# Patient Record
Sex: MALE | Race: BLACK OR AFRICAN AMERICAN
[De-identification: names, ages, dates, MRNs, and addresses within clinical notes are randomized per-mention and may not be internally consistent; named-entity substitution may affect disease eponyms.]

---

## 2018-04-01 ENCOUNTER — HOSPITAL ENCOUNTER (EMERGENCY)
Dept: HOSPITAL 17 - PHED | Age: 23
Discharge: HOME | End: 2018-04-01
Payer: SELF-PAY

## 2018-04-01 VITALS
SYSTOLIC BLOOD PRESSURE: 139 MMHG | OXYGEN SATURATION: 98 % | DIASTOLIC BLOOD PRESSURE: 73 MMHG | RESPIRATION RATE: 16 BRPM | TEMPERATURE: 99 F | HEART RATE: 76 BPM

## 2018-04-01 VITALS — HEART RATE: 80 BPM | SYSTOLIC BLOOD PRESSURE: 142 MMHG | RESPIRATION RATE: 16 BRPM | DIASTOLIC BLOOD PRESSURE: 77 MMHG

## 2018-04-01 VITALS — HEIGHT: 73 IN | WEIGHT: 199.08 LBS | BODY MASS INDEX: 26.38 KG/M2

## 2018-04-01 DIAGNOSIS — E11.9: Primary | ICD-10-CM

## 2018-04-01 DIAGNOSIS — Z88.0: ICD-10-CM

## 2018-04-01 PROCEDURE — 96372 THER/PROPH/DIAG INJ SC/IM: CPT

## 2018-04-01 PROCEDURE — 99283 EMERGENCY DEPT VISIT LOW MDM: CPT

## 2018-04-01 NOTE — PD
HPI


Chief Complaint:  Diabetic


Time Seen by Provider:  19:37


Travel History


International Travel<30 days:  No


Contact w/Intl Traveler<30days:  No


Traveled to known affect area:  No





History of Present Illness


HPI


This is a 22-year-old male presented the ER under police custody for insulin 

administration.  Patient is insulin-dependent diabetic and that he did not take 

his insulin today and is here for taken his insulin.  Patient has no complaints 

or symptoms.





PFSH


Past Medical History


Diabetes:  Yes


Patient Takes Glucophage:  No


Tetanus Vaccination:  Unknown


Influenza Vaccination:  No


Pregnant?:  Not Pregnant





Social History


Alcohol Use:  No


Tobacco Use:  No


Substance Use:  No





Allergies-Medications


(Allergen,Severity, Reaction):  


Coded Allergies:  


     amoxicillin (Verified  Allergy, Unknown, 4/1/18)





Review of Systems


Except as stated in HPI:  all other systems reviewed are Neg





Physical Exam


Narrative


GENERAL: Well-nourished, well-developed patient.


SKIN: Focused skin assessment warm/dry.


HEAD: Normocephalic.


EYES: No scleral icterus. No injection or drainage. 


NECK: Supple, trachea midline. No JVD or lymphadenopathy.


CARDIOVASCULAR: Regular rate and rhythm without murmurs, gallops, or rubs. 


RESPIRATORY: Breath sounds equal bilaterally. No accessory muscle use.


GASTROINTESTINAL: Abdomen soft, non-tender, nondistended. 


MUSCULOSKELETAL: No cyanosis, or edema. 


BACK: Nontender without obvious deformity. No CVA tenderness.





Data


Data


Last Documented VS





Vital Signs








  Date Time  Temp Pulse Resp B/P (MAP) Pulse Ox O2 Delivery O2 Flow Rate FiO2


 


4/1/18 19:36  80 16 142/77 (98)  Room Air  


 


4/1/18 19:36     99   


 


4/1/18 19:24 99.0       








Orders





 Orders


Insulin Detemir Inj (Levemir Inj) (4/1/18 19:45)


Insulin Human Regular Inj (Novolin R Inj (4/1/18 19:45)


Ed Discharge Order (4/1/18 19:44)








[facility]


Medical Decision Making


Medical Screen Exam Complete:  Yes


Emergency Medical Condition:  Yes


Differential Diagnosis


Diabetes


Narrative Course


This is a 22-year-old male presents to the ER for administering insulin.  

Patient is under police custody, he takes Levemir 5 units at night and regular 

insulin sliding scale.  Patient was given insulin here in the ER and repeat 

blood sugar this is reassuring and patient will be discharged.





Diagnosis





 Primary Impression:  


 Drug not available for administration


Disposition:  01 DISCHARGE HOME


Condition:  Stable











Bruce Estrella MD Apr 1, 2018 19:50